# Patient Record
Sex: FEMALE | URBAN - METROPOLITAN AREA
[De-identification: names, ages, dates, MRNs, and addresses within clinical notes are randomized per-mention and may not be internally consistent; named-entity substitution may affect disease eponyms.]

---

## 2018-05-23 ENCOUNTER — DOCTOR'S OFFICE (OUTPATIENT)
Dept: URBAN - METROPOLITAN AREA CLINIC 136 | Facility: CLINIC | Age: 53
Setting detail: OPHTHALMOLOGY
End: 2018-05-23
Payer: COMMERCIAL

## 2018-05-23 DIAGNOSIS — H52.03: ICD-10-CM

## 2018-05-23 DIAGNOSIS — H52.4: ICD-10-CM

## 2018-05-23 PROBLEM — H40.013 GLAUCOMA SUSPECT OU; BOTH EYES: Status: ACTIVE | Noted: 2018-05-23

## 2018-05-23 PROCEDURE — 92004 COMPRE OPH EXAM NEW PT 1/>: CPT | Performed by: OPTOMETRIST

## 2018-05-23 ASSESSMENT — REFRACTION_MANIFEST
OD_VA2: 20/
OD_VA1: 20/
OS_VA3: 20/
OD_VA3: 20/
OS_VA3: 20/
OU_VA: 20/
OU_VA: 20/
OD_VA1: 20/
OS_VA1: 20/
OS_VA1: 20/
OS_VA2: 20/
OD_VA3: 20/
OD_VA2: 20/
OS_VA2: 20/

## 2018-05-23 ASSESSMENT — REFRACTION_OUTSIDERX
OS_VA3: 20/
OD_SPHERE: +1.25
OD_CYLINDER: SPH
OS_VA2: 20/20
OD_VA1: 20/20
OS_SPHERE: +1.25
OS_ADD: +2.00
OS_VA1: 20/20-3
OD_VA3: 20/
OD_VA2: 20/20
OD_ADD: +2.00
OS_CYLINDER: SPH
OU_VA: 20/20

## 2018-05-23 ASSESSMENT — CONFRONTATIONAL VISUAL FIELD TEST (CVF)
OD_FINDINGS: FULL
OS_FINDINGS: FULL

## 2018-05-23 ASSESSMENT — REFRACTION_AUTOREFRACTION
OS_SPHERE: +1.25
OS_CYLINDER: -0.25
OD_SPHERE: +1.00
OD_CYLINDER: 0.00
OD_AXIS: 0
OS_AXIS: 141

## 2018-05-23 ASSESSMENT — SPHEQUIV_DERIVED
OD_SPHEQUIV: 1
OS_SPHEQUIV: 1.125

## 2018-05-23 ASSESSMENT — REFRACTION_CURRENTRX
OD_OVR_VA: 20/
OS_OVR_VA: 20/
OD_OVR_VA: 20/
OD_OVR_VA: 20/

## 2018-05-23 ASSESSMENT — VISUAL ACUITY
OS_BCVA: 20/50-2
OD_BCVA: 20/50-2

## 2018-05-24 ENCOUNTER — OPTICAL OFFICE (OUTPATIENT)
Dept: URBAN - METROPOLITAN AREA CLINIC 143 | Facility: CLINIC | Age: 53
Setting detail: OPHTHALMOLOGY
End: 2018-05-24
Payer: COMMERCIAL

## 2018-05-24 DIAGNOSIS — H52.4: ICD-10-CM

## 2018-05-24 PROCEDURE — V2200 LENS SPHER BIFOC PLANO 4.00D: HCPCS | Performed by: OPTOMETRIST

## 2018-05-24 PROCEDURE — V2020 VISION SVCS FRAMES PURCHASES: HCPCS | Performed by: OPTOMETRIST

## 2019-08-13 ENCOUNTER — OFFICE VISIT (OUTPATIENT)
Dept: URGENT CARE | Facility: MEDICAL CENTER | Age: 54
End: 2019-08-13
Payer: COMMERCIAL

## 2019-08-13 VITALS
BODY MASS INDEX: 32.25 KG/M2 | WEIGHT: 160 LBS | HEART RATE: 93 BPM | RESPIRATION RATE: 16 BRPM | DIASTOLIC BLOOD PRESSURE: 86 MMHG | OXYGEN SATURATION: 100 % | TEMPERATURE: 97.3 F | HEIGHT: 59 IN | SYSTOLIC BLOOD PRESSURE: 154 MMHG

## 2019-08-13 DIAGNOSIS — L23.7 POISON IVY DERMATITIS: Primary | ICD-10-CM

## 2019-08-13 PROCEDURE — G0383 LEV 4 HOSP TYPE B ED VISIT: HCPCS | Performed by: FAMILY MEDICINE

## 2019-08-13 PROCEDURE — 99204 OFFICE O/P NEW MOD 45 MIN: CPT | Performed by: FAMILY MEDICINE

## 2019-08-13 PROCEDURE — 99284 EMERGENCY DEPT VISIT MOD MDM: CPT | Performed by: FAMILY MEDICINE

## 2019-08-13 RX ORDER — ALBUTEROL SULFATE 90 UG/1
2 AEROSOL, METERED RESPIRATORY (INHALATION) EVERY 4 HOURS PRN
Refills: 1 | COMMUNITY
Start: 2019-07-31

## 2019-08-13 RX ORDER — MOMETASONE FUROATE AND FORMOTEROL FUMARATE DIHYDRATE 200; 5 UG/1; UG/1
AEROSOL RESPIRATORY (INHALATION)
Refills: 5 | COMMUNITY
Start: 2019-07-30

## 2019-08-13 RX ORDER — OMEPRAZOLE 20 MG/1
20 CAPSULE, DELAYED RELEASE ORAL DAILY
Refills: 3 | COMMUNITY
Start: 2019-07-14

## 2019-08-13 RX ORDER — FLUTICASONE PROPIONATE 50 MCG
SPRAY, SUSPENSION (ML) NASAL
Refills: 5 | COMMUNITY
Start: 2019-07-30

## 2019-08-13 RX ORDER — PREDNISONE 20 MG/1
TABLET ORAL
Qty: 18 TABLET | Refills: 0 | Status: SHIPPED | OUTPATIENT
Start: 2019-08-13

## 2019-08-13 RX ORDER — CETIRIZINE HYDROCHLORIDE 10 MG/1
10 TABLET ORAL EVERY EVENING
Refills: 3 | COMMUNITY
Start: 2019-07-30

## 2019-08-13 NOTE — PATIENT INSTRUCTIONS
I placed the patient on prednisone tapering from 60 down to 20 mg over 9 days  Advised patient to consider taking over-the-counter Benadryl tonight to avoid itching or scratching  Hiedra venenosa   LO QUE NECESITA SABER:   La hiedra venenosa es delicia planta que puede causar un salpullido molesto y que pica en la piel  La hiedra venenosa crece en forma de arbusto o de [employer], los angelo y las áreas de [address]  Tiene 3 hojas de intenso color manjeet que se ponen peters en el otoño  INSTRUCCIONES SOBRE EL LALO HOSPITALARIA:   Medicamentos:   · Cremas o ungüentos antisépticos o secantes:  Se podrían usar estos medicamentos para secar el salpullido y aliviar la comezón  Es posible que pueda adquirir estos productos sin receta médica  · Esteroides:  Estos medicamentos ayudan a aliviar la comezón y bajar la inflamación  Se pueden administrar en forma de crema para la piel o miguel pastillas  · Antihistamínicos:  Estos medicamentos podrían contribuir a aliviar la comezón para que pueda dormir  Está disponible sin receta médica  · Lindsey inessa medicamentos miguel se le haya indicado  Consulte con roger médico si usted aiden que roger medicamento no le está ayudando o si presenta efectos secundarios  Infórmele si es alérgico a algún medicamento  Mantenga delicia lista actualizada de los [place], las vitaminas y los productos herbales que chelsy  Incluya los siguientes datos de los medicamentos: cantidad, frecuencia y motivo de administración  Traiga con usted la lista o los envases de la píldoras a inessa citas de seguimiento  Lleve la lista de los medicamentos con usted en familia de delicia emergencia  Acuda a inessa consultas de control con roger médico según le indicaron  Anote inessa preguntas para que se acuerde de hacerlas stewart inessa visitas  Cómo se propaga el salpullido de la hiedra venenosa:  No se puede propagar el sarpullido al tocarlo o al tocar el líquido de las ampollas   Sólo se puede contagiar el salpullido si se rasca la piel cuando todavía está impregnada con el aceite de la planta  Puede que le parezca que el salpullido se está propagando porque continúa brotando a lo josefina de varios días  Delmar se debe a que las áreas donde la piel es más see se brotan amrik  Podría salirle un sarpullido en el javier o los antebrazos antes que en otras áreas donde la piel es más gruesa, miguel la chapin de las trina  Cuidados personales:   · Mantenga el salpullido limpio y seco:  Lávelo con agua y Mukesh  Séquelo cuidadosamente dando toques suaves con delicia toalla limpia  · Trate de no rascarse o frotar el salpullido:  Delmar podría hacer que se le infecte la piel  · Póngase delicia compresa sobre el salpullido:  Sumerja un paño limpio en agua fría  Escúrralo y [facility] sarpullido  Deje el paño sobre la piel stewart 15 minutos  Iliana esto al menos 3 veces al día  · [place] un baño con almidón de maíz o jesika:  Si el salpullido es demasiado colette miguel para cubrirlo con compresas frías, dese 3 o 4 joo con almidón de maíz a diario  Iliana delicia pasta con 1 gunner (1/2 kilo) de almidón de [place] y un poco de [place]  Añada esta pasta a delicia darrick llena de agua y Sean  También puede poner harina de jesika coloidal en el agua de la darrick  Use agua tibia  No use [place] ya que podría empeorar la picazón  Evite que le salga un salpullido de hiedra venenosa en el futuro:   · Protéjase la piel:  Vista pantalones largos, camisa de [place] largas y [place]  Use delicia crema bloqueadora para protegerse la piel del aceite de la hiedra venenosa  Se pueden adquirir estas cremas en delicia farmacia sin receta médica  · Missy Quiet roger ropa:  Lave las prendas de ropa que llevaba por separado si aiden que ha estado cerca de delicia hiedra venenosa  Enjuague gay el lavarropas después de sacar las prendas  [address] botas y zapatos con agua tibia british [place]  Limpie a seco las prendas que no pueda reyna con agua   El aceite de la hiedra venenosa es pegajoso y puede permanecer en las [address]  Puede causar un salpullido incluso años más tarde  · Bañe a roger mascota:  Lave el pelaje de roger mascota con agua tibia y champú  Lozano evitará que el aceite de la planta se propague a roger piel, automóvil y hogar  Vista camisas de mangas largas, pantalones largos y guantes para reyna a roger mascota o los objetos que tengan aceite  · Evite exponerse a la hiedra venenosa:  No toque las plantas que se parezcan a la hiedra venenosa  Quite la hiedra venenosa de roger jardín  Protéjase la piel y arranque la planta de Yany Flavia  Colóquelas en delicia bolsa plástica y cierre gay la bolsa  · No queme las plantas de hiedra venenosa:  Lozano puede hacer que el aceite de la planta se propague por el aire  Si el aceite [employer], podrían inflamarse y podría tener problemas graves para respirar  El aceite que se pega a las cenizas podría tocarle la piel y causar un sarpullido  Pregúntele a roger Anna Fent vitaminas y minerales son adecuados para usted  · Tiene pus, costras blandas nino o sensibilidad en el salpullido  · La comezón empeora o no le permite dormir por la noche  · El salpullido le cubre más de 1/4 de roger piel o se propaga a inessa ojos, boca o genitales  · El salpullido no mejora después de 2 o 3 semanas  · Tiene los ganglios de los lados del evgeny sensibles o inflamados  · Se le hinchan los brazos o las piernas  · Usted tiene preguntas o inquietudes acerca de roger condición o cuidado  Regrese a la kalani de emergencias si:   · Usted tiene fiebre  · Tiene la piel molly, inflamada o sensible alrededor del salpullido  · Usted kiara dificultad para respirar  © 2017 [address] Information is for End User's use only and may not be sold, redistributed or otherwise used for commercial purposes   All illustrations and images included in CareNotes® are the copyrighted property of A D A M , Inc  or [employer]  Esta información es sólo para uso en educación  Roger intención no es darle un consejo médico sobre enfermedades o tratamientos  Colsulte con roger Cherylyn Plough farmacéutico antes de seguir cualquier régimen médico para saber si es seguro y efectivo para usted

## 2019-08-13 NOTE — PROGRESS NOTES
[facility]        NAME: Juan Henley is a 47 y o  female  : 1965    MRN: [id number]  DATE: 2019  TIME: 4:12 PM    Assessment and Plan   Poison ivy dermatitis [L23 7]  1  Poison ivy dermatitis  predniSONE (DELTASONE) 20 mg tablet         Patient Instructions       Follow up with PCP in 3-5 days  Proceed to  ER if symptoms worsen  Chief Complaint     Chief Complaint   Patient presents with    Rash     Patient relates started with poison ivy rash to bilateral arms for 1 week  Denies fever  + itching  Tried Calamine lotion and benadryl topical          History of Present Illness       51-year-old female here today with complaint of rash of her arms for the past week  She is pulling weeds unaware she can contact with poison ivy  Rash 1st appeared right arm has spread to her left arm  This caused blistering and itching  She has been apply topical to calamine Benadryl jaw with no significant improvement  Review of Systems   Review of Systems   Constitutional: Negative  Skin: Positive for rash           Current Medications       Current Outpatient Medications:     albuterol (PROVENTIL HFA,VENTOLIN HFA) 90 mcg/act inhaler, Inhale 2 puffs every 4 (four) hours as needed, Disp: , Rfl: 1    cetirizine (ZyrTEC) 10 mg tablet, Take 10 mg by mouth every evening, Disp: , Rfl: 3    DULERA 200-5 MCG/ACT inhaler, TAKE 2 PUFFS BY MOUTH TWICE A DAY **PA**, Disp: , Rfl: 5    fluticasone (FLONASE) 50 mcg/act nasal spray, SPRAY 2 SPRAYS IN EACH NOSTRIL DAILY, Disp: , Rfl: 5    omeprazole (PriLOSEC) 20 mg delayed release capsule, Take 20 mg by mouth daily, Disp: , Rfl: 3    predniSONE (DELTASONE) 20 mg tablet, Take 3 tablets for 3 days then 2 tablets for 3 days then 1 tablet for 3 days then stop, Disp: 18 tablet, Rfl: 0    Current Allergies     Allergies as of 2019 - Reviewed 2019   Allergen Reaction Noted    Penicillins Throat Swelling 2019            The following portions of the patient's history were reviewed and updated as appropriate: allergies, current medications, past family history, past medical history, past social history, past surgical history and problem list      Past Medical History:   Diagnosis Date    Asthma        History reviewed  No pertinent surgical history  No family history on file  Medications have been verified  Objective   /86   Pulse 93   Temp (!) 97 3 °F (36 3 °C) (Tympanic)   Resp 16   Ht 4' 11" (1 499 m)   Wt 72 6 kg (160 lb)   SpO2 100%   BMI 32 32 kg/m²        Physical Exam     Physical Exam   Pulmonary/Chest: Effort normal and breath sounds normal    Skin: Rash noted  Numerous vesicular lesion with erythematous base which are cluster of her right and left forearm extending to the upper arm  Findings consistent with poison ivy dermatitis  Nursing note and vitals reviewed